# Patient Record
(demographics unavailable — no encounter records)

---

## 2025-01-30 NOTE — DISCUSSION/SUMMARY
[FreeTextEntry1] : stable exam/ pcp records reviewed sob/edema/chf/ CP- needs echo evaluation of LVEF, also ischemia evaluation with lexiscan nuclear, increase lasix to 80mg qd x3 days HTN stable on meds Lipids stable on statin AV block s/p PPM- needs EPS evaluation [EKG obtained to assist in diagnosis and management of assessed problem(s)] : EKG obtained to assist in diagnosis and management of assessed problem(s)

## 2025-01-30 NOTE — PHYSICAL EXAM
[Well Developed] : well developed [Well Nourished] : well nourished [No Acute Distress] : no acute distress [Normal Conjunctiva] : normal conjunctiva [Normal Venous Pressure] : normal venous pressure [No Carotid Bruit] : no carotid bruit [Normal S1, S2] : normal S1, S2 [No Murmur] : no murmur [No Rub] : no rub [No Gallop] : no gallop [Clear Lung Fields] : clear lung fields [Good Air Entry] : good air entry [No Respiratory Distress] : no respiratory distress  [Soft] : abdomen soft [Non Tender] : non-tender [No Masses/organomegaly] : no masses/organomegaly [Normal Bowel Sounds] : normal bowel sounds [Normal Gait] : normal gait [No Cyanosis] : no cyanosis [No Clubbing] : no clubbing [No Rash] : no rash [Moves all extremities] : moves all extremities [No Focal Deficits] : no focal deficits [Normal Speech] : normal speech [Alert and Oriented] : alert and oriented [Normal memory] : normal memory [de-identified] : 2+

## 2025-01-30 NOTE — HISTORY OF PRESENT ILLNESS
[FreeTextEntry1] : for cardiac evaluation c/o worsening ARRIAGA, LE edema, some orthopnea, improved somewhat since started on Lasix by PCP h/o PPM, noted checked since placed in 2017

## 2025-01-31 NOTE — DISCUSSION/SUMMARY
[FreeTextEntry1] : stable exam CHF- moderately reduced LVEF by echo, results disciussed, to maximize medical therapy, increase lisinopril, f/u next week cp/sob- r/o ischemia, f/u for lexiscan stress

## 2025-01-31 NOTE — PHYSICAL EXAM
[Well Developed] : well developed [Well Nourished] : well nourished [No Acute Distress] : no acute distress [Normal Conjunctiva] : normal conjunctiva [Normal Venous Pressure] : normal venous pressure [No Carotid Bruit] : no carotid bruit [Normal S1, S2] : normal S1, S2 [No Murmur] : no murmur [No Rub] : no rub [No Gallop] : no gallop [Clear Lung Fields] : clear lung fields [Good Air Entry] : good air entry [No Respiratory Distress] : no respiratory distress  [Soft] : abdomen soft [Non Tender] : non-tender [No Masses/organomegaly] : no masses/organomegaly [Normal Bowel Sounds] : normal bowel sounds [Normal Gait] : normal gait [No Cyanosis] : no cyanosis [No Clubbing] : no clubbing [No Rash] : no rash [Moves all extremities] : moves all extremities [No Focal Deficits] : no focal deficits [Normal Speech] : normal speech [Alert and Oriented] : alert and oriented [Normal memory] : normal memory [de-identified] : 2+

## 2025-01-31 NOTE — REASON FOR VISIT
[Symptom and Test Evaluation] : symptom and test evaluation [Cardiac Failure] : cardiac failure [Hyperlipidemia] : hyperlipidemia [Hypertension] : hypertension

## 2025-02-07 NOTE — DISCUSSION/SUMMARY
[FreeTextEntry1] : stable exam edema improved/ cellulitis- course of amoxicillin CHF/CMP stable, decrease lasix to qd, add coreg 3.125, f/u 2 weeks

## 2025-02-07 NOTE — PHYSICAL EXAM
[Well Developed] : well developed [Well Nourished] : well nourished [No Acute Distress] : no acute distress [Normal Conjunctiva] : normal conjunctiva [Normal Venous Pressure] : normal venous pressure [No Carotid Bruit] : no carotid bruit [Normal S1, S2] : normal S1, S2 [No Murmur] : no murmur [No Rub] : no rub [No Gallop] : no gallop [Clear Lung Fields] : clear lung fields [Good Air Entry] : good air entry [No Respiratory Distress] : no respiratory distress  [Soft] : abdomen soft [Non Tender] : non-tender [No Masses/organomegaly] : no masses/organomegaly [Normal Bowel Sounds] : normal bowel sounds [Normal Gait] : normal gait [No Cyanosis] : no cyanosis [No Clubbing] : no clubbing [No Rash] : no rash [Moves all extremities] : moves all extremities [No Focal Deficits] : no focal deficits [Normal Speech] : normal speech [Alert and Oriented] : alert and oriented [Normal memory] : normal memory [de-identified] : erythema LLE

## 2025-03-06 NOTE — REASON FOR VISIT
[Cardiac Failure] : cardiac failure [Arrhythmia/ECG Abnorrmalities] : arrhythmia/ECG abnormalities [Structural Heart and Valve Disease] : structural heart and valve disease [Hyperlipidemia] : hyperlipidemia [Hypertension] : hypertension [Other: ____] : [unfilled]

## 2025-03-06 NOTE — DISCUSSION/SUMMARY
[FreeTextEntry1] : stable exam, may return to work CMP/ HFrEF- stable, severe reduced non ischemia, euvolemic, increase coreg, refer HF team CHB s/p PPM- refer EPS eval HTN stable Lipids stable niddm poor control, f/u endo

## 2025-03-06 NOTE — PHYSICAL EXAM
[Well Developed] : well developed [Well Nourished] : well nourished [No Acute Distress] : no acute distress [Normal Conjunctiva] : normal conjunctiva [Normal Venous Pressure] : normal venous pressure [No Carotid Bruit] : no carotid bruit [Normal S1, S2] : normal S1, S2 [No Murmur] : no murmur [No Rub] : no rub [No Gallop] : no gallop [Clear Lung Fields] : clear lung fields [Good Air Entry] : good air entry [No Respiratory Distress] : no respiratory distress  [Soft] : abdomen soft [Non Tender] : non-tender [No Masses/organomegaly] : no masses/organomegaly [Normal Bowel Sounds] : normal bowel sounds [Normal Gait] : normal gait [No Cyanosis] : no cyanosis [No Clubbing] : no clubbing [No Rash] : no rash [Moves all extremities] : moves all extremities [No Focal Deficits] : no focal deficits [Normal Speech] : normal speech [Alert and Oriented] : alert and oriented [Normal memory] : normal memory [de-identified] : trace

## 2025-03-12 NOTE — PHYSICAL EXAM
[Well Developed] : well developed [No Acute Distress] : no acute distress [Normal Venous Pressure] : normal venous pressure [Normal S1, S2] : normal S1, S2 [No Murmur] : no murmur [Clear Lung Fields] : clear lung fields [No Respiratory Distress] : no respiratory distress  [de-identified] : 3+ pitting edema b/l.  Some erythem anoted on the LLE

## 2025-03-12 NOTE — ADDENDUM
[FreeTextEntry1] :  I, Aimee Cox, am scribing for and the presence of Dr. Coleman the following sections: HPI, PMH,Family/social history, ROS, Physical Exam, Assessment / Plan.  I, Frank Coleman, personally performed the services described in the documentation, reviewed the documentation recorded by the scribe in my presence and it accurately and completely records my words and actions.

## 2025-03-12 NOTE — DISCUSSION/SUMMARY
[FreeTextEntry1] : Impressions:  1. CHB w/ dual chamber Medtronic pacemaker.  RV paced 100% and normal device function. 2. non ischemic cardiomyopathy with LVEF 33-43% 3. HTN 4. hyperlipidemia 5. IDDM type II.   Plan: Mr. Stevens has recently been diagnosed w/ non ischemic cardiomyopathy with LVEF of 33-43%.  His pacemaker is functioning normally as programmed.  He has an underlying complete AV block and is chronically RV paced 100% of the time.  EKG today shows a wide QRS at 200msec.   Mr. Stevens reports having an LBBB on ECG prior to requiring PPM (implanted at [facility]).  The etiology of his myopathy is not clear and he will benefit from GDMT.  Would also consider cardiac MRI to assess for infiltrative myocardial diesase.  Ultimately upgrade to CRT-P or CRT D device would likely help improve LV Function.   I have reviewed the benefits, risks and alternatives to device implantation with Mr. ISAAC STEVENS . He is aware that there is a small risk of MI, CVA, death, bleeding, infection, and tamponade requiring cardiac surgery.  Will discuss upgrade after consultation with CHF team.  Follow up in 6 weeks.  Consider repeat Echo to reevaluate EF prior to proceeding with device upgrade. Wiill also need PA and lateral CXR to assess lead placement.  [EKG obtained to assist in diagnosis and management of assessed problem(s)] : EKG obtained to assist in diagnosis and management of assessed problem(s)

## 2025-03-12 NOTE — REVIEW OF SYSTEMS
[Feeling Fatigued] : feeling fatigued [SOB] : shortness of breath [Dyspnea on exertion] : dyspnea during exertion [Lower Ext Edema] : lower extremity edema [Rash] : rash [Negative] : Heme/Lymph [Fever] : no fever [Chills] : no chills [Chest Discomfort] : no chest discomfort [Leg Claudication] : no intermittent leg claudication [Palpitations] : no palpitations [Orthopnea] : no orthopnea [PND] : no PND [Syncope] : no syncope [de-identified] : LLE erythema.

## 2025-03-12 NOTE — HISTORY OF PRESENT ILLNESS
[FreeTextEntry1] : Mr. Blackmon is a 59yo M.  He follows with Dr. Goldstein.  He has CHB s/p Medtronic dual chamber PPM (implanted at [facility] in 2017) and is referred for cardiomyopathy and device evaluation.  PMHx includes HTN, Hyperlipidemia, IDDM type II.  He does not consistently follow up for device checks with [facility].  More recently he developed worsening ARRIAGA, fatigue and LE swelling.  TTE 1/2025 significant for newly reduced EF of 33%.  He was initiated on GDMT.  He underwent NST which was abnormal and referred for cardiac catheterization which showed non obstructive CAD.  Today he is doing well.  Since starting medications, his LE swelling and SOB has improved although still present.  He continues to volunteer as .  He denies any dizziness, syncope, chest pain, fevers, chills.  He will establish care with heart failure soon. He reports that he can climb one flight of stairs without stopping.   EKG 1/30/25: V paced 93bpm, QRS 200msec  TTE 1/31/25: LVEF 33%, regional wall motion abnormalities, normal LV diastolic function w/ normal LV filling pressures, mild AR (difficult study)  Cardiac Cath 2/28/25: mild diffuse disease in the LM and LAD.  mRCA 50% stenosis (IFR 0.94).   Device Interrogation Medtronic Advisa dual chamber Pacemaker DDD 50-150bpm Underlying complete AV block V paced 99.9%,  A paced 1.7% All pacing, sensing and impedance levels within normal limits Atrial arrhythmia episodes which are brief.  appear to be related to RV lead noise vs brief AT.  Battery estimating 1.5yrs until [place]

## 2025-03-27 NOTE — CARDIOLOGY SUMMARY
[de-identified] : TTE 1/31/25: LVEF 33%, regional wall motion abnormalities, normal LV diastolic function w/ normal LV filling pressures, mild AR (difficult study) [de-identified] : Device Interrogation Medtronic Advisa dual chamber Pacemaker DDD 50-150bpm Underlying complete AV block V paced 99.9%, A paced 1.7% All pacing, sensing and impedance levels within normal limits Atrial arrhythmia episodes which are brief. appear to be related to RV lead noise vs brief AT. Battery estimating 1.5yrs until [facility] [de-identified] : Cardiac Cath 2/28/25: mild diffuse disease in the LM and LAD. mRCA 50% stenosis (IFR 0.94).

## 2025-03-27 NOTE — DISCUSSION/SUMMARY
[Patient] : the patient [Minutes Spent: ___] : for [unfilled] ~Uminutes [___ Month(s)] : in [unfilled] month(s) [EKG obtained to assist in diagnosis and management of assessed problem(s)] : EKG obtained to assist in diagnosis and management of assessed problem(s)

## 2025-03-27 NOTE — REASON FOR VISIT
[Cardiac Failure] : cardiac failure [FreeTextEntry3] : Jared Goldstein [FreeTextEntry1] : 60M PMHx idiopathic DCM and CHB s/p dual-chamber PPM with high RV-pacing burden and suspected PM-related cardiomyopathy EF dropped in 1/2025, initiated on GDMT, no recovery in LV function LHC at that time revealed non-obstructive CAD PMHx includes HTN, HLD, DM2

## 2025-03-27 NOTE — HISTORY OF PRESENT ILLNESS
[FreeTextEntry1] : Reports stable NYHA 2 symptoms No recent HF exacerbation EKG today V-paced in 80s, RV pacing with QRSd 200

## 2025-03-27 NOTE — ASSESSMENT
[FreeTextEntry1] : 60M with idiopathic DCM, EF 33%, KIKI 48, stable NYHA 2 symptoms: - BB: Coreg 6.25 - RAASi: switch lisinopril 40 to Ent 49-51, washout instructions given - MRA: will start next week based on Ent tolerance and repeat BMP next week - SGTL2i: start Jardiance 10 - HF workup: repeat echo after med optimization (likely 6/2025) - Devices: would consider CRT upgrade if EF doesn't improve with optimal medical therapy  DM2: poorly controlled A1c 8.3, mgmt per endocrinologist HTN, HLD: per primary cardiologist, LDL 73  RTC 3mo

## 2025-04-23 NOTE — DISCUSSION/SUMMARY
[FreeTextEntry1] : Impressions:  1. CHB w/ dual chamber Medtronic pacemaker.  RV paced 100% and normal device function. 2. non ischemic cardiomyopathy with LVEF 33-43% 3. HTN 4. hyperlipidemia 5. IDDM type II.   Plan: Mr. Stevens has recently been diagnosed w/ non ischemic cardiomyopathy with reduced  LVEF. His pacemaker is functioning normally as programmed.  He has an underlying complete AV block and is chronically RV paced 100% of the time.  Follow up in July after repeat echo - If EF remains depressed would consider CRT-P or if indicated- CRT-D.  Mr. Stevens reports having an LBBB on ECG prior to requiring PPM (implanted at [facility]).  The etiology of his myopathy is not clear. He is following with Dr. Cain and his GDMT is under titration. Would also consider cardiac MRI to assess for infiltrative myocardial diesase.  Ultimately upgrade to CRT-P or CRT D device would likely help improve LV Function.   I have reviewed the benefits, risks and alternatives to device implantation with Mr. ISAAC STEVENS . He is aware that there is a small risk of MI, CVA, death, bleeding, infection, and tamponade requiring cardiac surgery.    Follow up in 6 weeks.  Consider repeat Echo to reevaluate EF prior to proceeding with device upgrade. Wiill also need PA and lateral CXR to assess lead placement.  [EKG obtained to assist in diagnosis and management of assessed problem(s)] : EKG obtained to assist in diagnosis and management of assessed problem(s)

## 2025-04-23 NOTE — HISTORY OF PRESENT ILLNESS
[FreeTextEntry1] : Mr. Blackmon is a 61yo M.  He follows with Dr. Goldstein.  He has a history of CHB s/p Medtronic dual chamber PPM (implanted at [facility] in 2017) and is referred for cardiomyopathy and device evaluation.  PMHx includes HTN, Hyperlipidemia, IDDM type II.  He does not consistently follow up for device checks with [facility].  More recently he developed worsening ARRIAGA, fatigue and LE swelling.  TTE 1/2025 significant for newly reduced EF of 33%.  He was initiated on GDMT.  He underwent NST which was abnormal and referred for cardiac catheterization which showed non obstructive CAD.  Today he is doing well.  Since starting medications, his LE swelling and SOB has improved although still present.  He continues to volunteer as .  He denies any dizziness, syncope, chest pain, fevers, chills.  He reports that he can climb one flight of stairs without stopping. He saw Dr. Cain one month ago and started Entresto 49/51 approximately two weeks ago.  He is not taking Jardiance due to cost ($150/mo).   EKG 3/27/25: AsVp 83bpm, QRS 200ms EKG 1/30/25: V paced 93bpm, QRS 200msec  TTE 1/31/25: LVEF 33%, regional wall motion abnormalities, normal LV diastolic function w/ normal LV filling pressures, mild AR (difficult study)  Cardiac Cath 2/28/25: mild diffuse disease in the LM and LAD.  mRCA 50% stenosis (IFR 0.94).   Device Interrogation Medtronic Advisa dual chamber Pacemaker DDD 50-150bpm Underlying complete AV block V paced 99.9%,  A paced 1.7% All pacing, sensing and impedance levels within normal limits Atrial arrhythmia episodes which are brief.  appear to be related to RV lead noise vs brief AT.  Battery estimating 1.5yrs until [place]

## 2025-04-23 NOTE — PHYSICAL EXAM
[No Acute Distress] : no acute distress [Obese] : obese [Normal Conjunctiva] : normal conjunctiva [Normal Venous Pressure] : normal venous pressure [Normal S1, S2] : normal S1, S2 [No Murmur] : no murmur [No Rub] : no rub [No Gallop] : no gallop [Clear Lung Fields] : clear lung fields [Good Air Entry] : good air entry [Soft] : abdomen soft [Normal Gait] : normal gait [No Rash] : no rash [Moves all extremities] : moves all extremities [No Focal Deficits] : no focal deficits [Normal Speech] : normal speech [Alert and Oriented] : alert and oriented [Normal memory] : normal memory

## 2025-04-23 NOTE — REVIEW OF SYSTEMS
[Snoring] : snoring [Negative] : Heme/Lymph [SOB] : no shortness of breath [Palpitations] : no palpitations [Orthopnea] : no orthopnea [PND] : no PND [Syncope] : no syncope [Rash] : no rash [Dizziness] : no dizziness

## 2025-05-02 NOTE — DISCUSSION/SUMMARY
[FreeTextEntry1] : stable exam CMP- stable, non ischemia, HF notes reviewed, increase coreg, f/u echo eval of LVEF CHB s/p PPM may need upgrade to BiV, EPS notes reviewed HTN stable Lipids stable

## 2025-05-02 NOTE — PHYSICAL EXAM
[Well Developed] : well developed [Well Nourished] : well nourished [No Acute Distress] : no acute distress [Normal Conjunctiva] : normal conjunctiva [Normal Venous Pressure] : normal venous pressure [No Carotid Bruit] : no carotid bruit [Normal S1, S2] : normal S1, S2 [No Murmur] : no murmur [No Rub] : no rub [No Gallop] : no gallop [Clear Lung Fields] : clear lung fields [Good Air Entry] : good air entry [No Respiratory Distress] : no respiratory distress  [Soft] : abdomen soft [Non Tender] : non-tender [No Masses/organomegaly] : no masses/organomegaly [Normal Bowel Sounds] : normal bowel sounds [Normal Gait] : normal gait [No Cyanosis] : no cyanosis [No Clubbing] : no clubbing [No Rash] : no rash [Moves all extremities] : moves all extremities [No Focal Deficits] : no focal deficits [Normal Speech] : normal speech [Alert and Oriented] : alert and oriented [Normal memory] : normal memory [de-identified] : trace